# Patient Record
Sex: MALE | Race: BLACK OR AFRICAN AMERICAN
[De-identification: names, ages, dates, MRNs, and addresses within clinical notes are randomized per-mention and may not be internally consistent; named-entity substitution may affect disease eponyms.]

---

## 2019-01-04 ENCOUNTER — HOSPITAL ENCOUNTER (EMERGENCY)
Dept: HOSPITAL 97 - ER | Age: 29
Discharge: HOME | End: 2019-01-04
Payer: COMMERCIAL

## 2019-01-04 DIAGNOSIS — J10.1: Primary | ICD-10-CM

## 2019-01-04 PROCEDURE — 87804 INFLUENZA ASSAY W/OPTIC: CPT

## 2019-01-04 PROCEDURE — 99283 EMERGENCY DEPT VISIT LOW MDM: CPT

## 2019-01-04 NOTE — ER
Nurse's Notes                                                                                     

 [facility]                                                                

Name: Gama Conley III                                                                          

Age: 28 yrs                                                                                       

Sex: Male                                                                                         

: 1990                                                                                   

MRN: [id number]                                                                                   

Arrival Date: 2019                                                                          

Time: 12:49                                                                                       

Account#: [id number]                                                                            

Bed 9                                                                                             

Private MD: Tyler Wasserman A                                                                   

Diagnosis: Influenza due to identified novel influenza A virus                                    

                                                                                                  

Presentation:                                                                                     

                                                                                             

13:06 Presenting complaint: Patient states: fever, cough and nasal congestion for 2-3 days;   hj  

      T- 101.2 last night; took acetaminophen last night; denies sore throat;. Transition of      

      care: patient was not received from another setting of care. Onset of symptoms was          

      2019. Risk Assessment: Do you want to hurt yourself or someone else?            

      Patient reports no desire to harm self or others. Initial Sepsis Screen: Does the           

      patient meet any 2 criteria? No. Patient's initial sepsis screen is negative. Does the      

      patient have a suspected source of infection? No. Patient's initial sepsis screen is        

      negative. Care prior to arrival: None.                                                      

13:06 Method Of Arrival: Ambulatory                                                             

13:06 Acuity: SHIRIN 4                                                                           hj  

                                                                                                  

Triage Assessment:                                                                                

13:07 General: Appears in no apparent distress. uncomfortable, Behavior is calm, cooperative, hj  

      appropriate for age. Pain: Denies pain.                                                     

                                                                                                  

Historical:                                                                                       

- Allergies:                                                                                      

13:07 No Known Allergies;                                                                     hj  

- Home Meds:                                                                                      

13:07 None [Active];                                                                          hj  

- PMHx:                                                                                           

13:07 None;                                                                                   hj  

- PSHx:                                                                                           

13:07 None;                                                                                   hj  

                                                                                                  

- Immunization history:: Adult Immunizations up to date.                                          

- Social history:: Smoking status: Patient/guardian denies using tobacco,                         

  Patient/guardian denies using street drugs.                                                     

- Ebola Screening: : Patient negative for fever greater than or equal to 101.5 degrees            

  Fahrenheit, and additional compatible Ebola Virus Disease symptoms Patient denies               

  exposure to infectious person Patient denies travel to an Ebola-affected area in the            

  21 days before illness onset.                                                                   

                                                                                                  

                                                                                                  

Screenin:08 Abuse screen: Denies threats or abuse. Denies injuries from another. Nutritional        hj  

      screening: No deficits noted. Tuberculosis screening: No symptoms or risk factors           

      identified. Fall Risk None identified.                                                      

                                                                                                  

Vital Signs:                                                                                      

13:08  / 99; Pulse 110; Resp 18; Temp 98.5(O); Pulse Ox 99% on R/A; Weight 99.79 kg;    hj  

      Height 5 ft. 10 in. (177.80 cm); Pain 0/10;                                                 

13:08 Body Mass Index 31.57 (99.79 kg, 177.80 cm)                                             hj  

                                                                                                  

ED Course:                                                                                        

12:49 Patient arrived in ED.                                                                  mr  

12:49 Tyler Wasserman MD is Private Physician.                                              mr  

13:07 Triage completed.                                                                       hj  

13:08 Arm band placed on left wrist.                                                          hj  

13:08 Patient has correct armband on for positive identification. Bed in low position. Call   hj  

      light in reach. Side rails up X 1. Adult w/ patient.                                        

13:59 Deni Arshad, RN is Primary Nurse.                                                    hj  

14:03 Myles Avilez PA is PHCP.                                                               jr8 

14:03 Loi Grajeda MD is Attending Physician.                                              jr8 

14:35 Tyler Wasserman MD is Referral Physician.                                             jr8 

14:56 No provider procedures requiring assistance completed. Patient did not have IV access   hj  

      during this emergency room visit.                                                           

                                                                                                  

Administered Medications:                                                                         

14:05 Drug: Tamiflu 75 mg Route: PO;                                                          hj  

14:08 Follow up: Response: No adverse reaction                                                hj  

                                                                                                  

                                                                                                  

Outcome:                                                                                          

14:36 Discharge ordered by MD.                                                                jr8 

14:56 Discharged to home ambulatory.                                                          hj  

14:56 Condition: stable                                                                           

14:56 Discharge instructions given to patient, family, Instructed on discharge instructions,      

      follow up and referral plans. medication usage, Demonstrated understanding of               

      instructions, follow-up care, medications, Prescriptions given X 3.                         

14:56 Patient left the ED.                                                                    hj  

                                                                                                  

Signatures:                                                                                       

Latha Pierce                                 mr                                                   

Myles Avilez PA PA   jr8                                                  

Deni Arshad RN                      RN   hj                                                   

                                                                                                  

Corrections: (The following items were deleted from the chart)                                    

13:17 13:08 99.79 kg; Height 5 ft. 10 in.; BMI: 31.5; Pain 0/10; hj                           hj  

13:19 13:08 Pulse 110bpm; Resp 18bpm; Pulse Ox 99% RA; Temp 98.5F Oral; 99.79 kg; Height 5    hj  

      ft. 10 in.; BMI: 31.5; Pain 0/10; hj                                                        

                                                                                                  

**************************************************************************************************

## 2019-01-04 NOTE — EDPHYS
Physician Documentation                                                                           

 [facility]                                                                

Name: Gama Conley III                                                                          

Age: 28 yrs                                                                                       

Sex: Male                                                                                         

: 1990                                                                                   

MRN: [id number]                                                                                   

Arrival Date: 2019                                                                          

Time: 12:49                                                                                       

Account#: [id number]                                                                            

Bed 9                                                                                             

Private MD: Tyler Wasserman, A                                                                   

ED Physician Loi Grajeda                                                                       

HPI:                                                                                              

                                                                                             

14:13 This 28 yrs old Black Male presents to ER via Ambulatory with complaints of Flu         jr8 

      Symptoms.                                                                                   

14:13 Patient with flu symptoms that started 2 days ago. Severity of symptoms: At their worst jr8 

      the symptoms were mild in the emergency department the symptoms are unchanged. The          

      patient has not experienced similar symptoms in the past. The patient has not recently      

      seen a physician.                                                                           

                                                                                                  

Historical:                                                                                       

- Allergies:                                                                                      

13:07 No Known Allergies;                                                                     hj  

- Home Meds:                                                                                      

13:07 None [Active];                                                                          hj  

- PMHx:                                                                                           

13:07 None;                                                                                   hj  

- PSHx:                                                                                           

13:07 None;                                                                                   hj  

                                                                                                  

- Immunization history:: Adult Immunizations up to date.                                          

- Social history:: Smoking status: Patient/guardian denies using tobacco,                         

  Patient/guardian denies using street drugs.                                                     

- Ebola Screening: : Patient negative for fever greater than or equal to 101.5 degrees            

  Fahrenheit, and additional compatible Ebola Virus Disease symptoms Patient denies               

  exposure to infectious person Patient denies travel to an Ebola-affected area in the            

  21 days before illness onset.                                                                   

                                                                                                  

                                                                                                  

ROS:                                                                                              

14:13 Eyes: Negative for injury, pain, redness, and discharge, Neck: Negative for injury,     jr8 

      pain, and swelling, Cardiovascular: Negative for chest pain, palpitations, and edema,       

      Abdomen/GI: Negative for abdominal pain, nausea, vomiting, diarrhea, and constipation,      

      Back: Negative for injury and pain, MS/Extremity: Negative for injury and deformity,        

      Skin: Negative for injury, rash, and discoloration, Neuro: Negative for headache,           

      weakness, numbness, tingling, and seizure.                                                  

14:13 ENT: Positive for rhinorrhea, sinus congestion.                                             

14:13 Respiratory: Positive for cough, Negative for dyspnea on exertion, shortness of breath,     

      sputum production, wheezing.                                                                

                                                                                                  

Exam:                                                                                             

14:13 Eyes:  Pupils equal round and reactive to light, extra-ocular motions intact.  Lids and jr8 

      lashes normal.  Conjunctiva and sclera are non-icteric and not injected.  Cornea within     

      normal limits.  Periorbital areas with no swelling, redness, or edema. ENT:  Nares          

      patent. No nasal discharge, no septal abnormalities noted.  Tympanic membranes are          

      normal and external auditory canals are clear.  Oropharynx with no redness, swelling,       

      or masses, exudates, or evidence of obstruction, uvula midline.  Mucous membranes           

      moist. Neck:  Trachea midline, no thyromegaly or masses palpated, and no cervical           

      lymphadenopathy.  Supple, full range of motion without nuchal rigidity, or vertebral        

      point tenderness.  No Meningismus. Cardiovascular:  Regular rate and rhythm with a          

      normal S1 and S2.  No gallops, murmurs, or rubs.  Normal PMI, no JVD.  No pulse             

      deficits. Respiratory:  Lungs have equal breath sounds bilaterally, clear to                

      auscultation and percussion.  No rales, rhonchi or wheezes noted.  No increased work of     

      breathing, no retractions or nasal flaring. Abdomen/GI:  Soft, non-tender, with normal      

      bowel sounds.  No distension or tympany.  No guarding or rebound.  No evidence of           

      tenderness throughout. Back:  No spinal tenderness.  No costovertebral tenderness.          

      Full range of motion. Skin:  Warm, dry with normal turgor.  Normal color with no            

      rashes, no lesions, and no evidence of cellulitis. MS/ Extremity:  Pulses equal, no         

      cyanosis.  Neurovascular intact.  Full, normal range of motion. Neuro:  Awake and           

      alert, GCS 15, oriented to person, place, time, and situation.  Cranial nerves II-XII       

      grossly intact.  Motor strength 5/5 in all extremities.  Sensory grossly intact.            

      Cerebellar exam normal.  Normal gait.                                                       

                                                                                                  

Vital Signs:                                                                                      

13:08  / 99; Pulse 110; Resp 18; Temp 98.5(O); Pulse Ox 99% on R/A; Weight 99.79 kg;    hj  

      Height 5 ft. 10 in. (177.80 cm); Pain 0/10;                                                 

13:08 Body Mass Index 31.57 (99.79 kg, 177.80 cm)                                               

                                                                                                  

MDM:                                                                                              

14:03 Patient medically screened.                                                             [facility] 

14:13 Data reviewed: vital signs, nurses notes, lab test result(s), and as a result, I will   jr8 

      discharge patient. Data interpreted: Pulse oximetry: on room air is 99 %.                   

      Interpretation: normal. Counseling: I had a detailed discussion with the patient and/or     

      guardian regarding: the historical points, exam findings, and any diagnostic results        

      supporting the discharge/admit diagnosis, lab results, the need for outpatient follow       

      up, a family practitioner, to return to the emergency department if symptoms worsen or      

      persist or if there are any questions or concerns that arise at home.                       

                                                                                                  

                                                                                             

13:18 Order name: Flu; Complete Time: 14:03                                                     

                                                                                                  

Administered Medications:                                                                         

14:05 Drug: Tamiflu 75 mg Route: PO;                                                            

14:08 Follow up: Response: No adverse reaction                                                  

                                                                                                  

                                                                                                  

Disposition:                                                                                      

19 14:36 Discharged to Home. Impression: Influenza due to identified novel influenza A      

  virus.                                                                                          

- Condition is Stable.                                                                            

- Discharge Instructions: Influenza, Adult.                                                       

- Prescriptions for Tamiflu 75 mg Oral Capsule - take 1 capsule by ORAL route every 12            

  hours for 5 days; 10 capsule. Tessalon Perles 100 mg Oral Capsule - take 1 capsule by           

  ORAL route every 8 hours As needed; 15 capsule. Guaifenesin AC 10- 100 mg/5 mL Oral             

  Liquid - take 10 milliliter by ORAL route every 4 hours As needed; 240 milliliter.              

- Medication Reconciliation Form, Thank You Letter, Antibiotic Education, Prescription            

  Opioid Use form.                                                                                

- Follow up: Tyler Wasserman MD; When: 1 week; Reason: Recheck today's complaints,              

  Continuance of care, Re-evaluation by your physician.                                           

- Problem is new.                                                                                 

- Symptoms have improved.                                                                         

                                                                                                  

                                                                                                  

                                                                                                  

Addendum:                                                                                         

01/15/2019                                                                                        

     08:19 Co-signature as Attending Physician, Loi Grajeda MD I agree with the assessment and   k
dr

           plan of care.                                                                          

                                                                                                  

Signatures:                                                                                       

Dispatcher [employer]                           EDMS                                                 

Loi Grajeda MD MD   [facility]                                                  

Myles Avilez PA PA   jr8                                                  

Deni Arshad RN RN                                                      

                                                                                                  

Corrections: (The following items were deleted from the chart)                                    

                                                                                             

14:56 14:36 2019 14:36 Discharged to Home. Impression: Influenza due to identified      hj  

      novel influenza A virus. Condition is Stable. Forms are Medication Reconciliation Form,     

      Thank You Letter, Antibiotic Education, Prescription Opioid Use. Follow up: Tyler Wasserman; When: 1 week; Reason: Recheck today's complaints, Continuance of care,             

      Re-evaluation by your physician. Problem is new. Symptoms have improved. jr8                

                                                                                                  

**************************************************************************************************